# Patient Record
Sex: MALE | Race: WHITE | Employment: FULL TIME | URBAN - METROPOLITAN AREA
[De-identification: names, ages, dates, MRNs, and addresses within clinical notes are randomized per-mention and may not be internally consistent; named-entity substitution may affect disease eponyms.]

---

## 2018-07-27 ENCOUNTER — HOSPITAL ENCOUNTER (EMERGENCY)
Age: 28
Discharge: HOME | End: 2018-07-27
Attending: EMERGENCY MEDICINE

## 2018-07-27 VITALS
DIASTOLIC BLOOD PRESSURE: 78 MMHG | HEART RATE: 78 BPM | SYSTOLIC BLOOD PRESSURE: 124 MMHG | TEMPERATURE: 98.3 F | HEIGHT: 69 IN | OXYGEN SATURATION: 98 % | WEIGHT: 190 LBS | RESPIRATION RATE: 20 BRPM | BODY MASS INDEX: 28.14 KG/M2

## 2018-07-27 DIAGNOSIS — S69.91XA FINGER INJURY, RIGHT, INITIAL ENCOUNTER: ICD-10-CM

## 2018-07-27 DIAGNOSIS — T75.4XXA ELECTROCUTION AND NONFATAL EFFECTS OF ELECTRIC CURRENT, INITIAL ENCOUNTER: Primary | ICD-10-CM

## 2018-07-27 LAB
ANION GAP SERPL CALCULATED.3IONS-SCNC: 12 MMOL/L (ref 3–16)
BUN BLDV-MCNC: 11 MG/DL (ref 7–20)
CALCIUM SERPL-MCNC: 9.5 MG/DL (ref 8.3–10.6)
CHLORIDE BLD-SCNC: 103 MMOL/L (ref 99–110)
CO2: 26 MMOL/L (ref 21–32)
CREAT SERPL-MCNC: 1 MG/DL (ref 0.9–1.3)
GFR AFRICAN AMERICAN: >60
GFR NON-AFRICAN AMERICAN: >60
GLUCOSE BLD-MCNC: 99 MG/DL (ref 70–99)
HCT VFR BLD CALC: 48 % (ref 40.5–52.5)
HEMOGLOBIN: 16.8 G/DL (ref 13.5–17.5)
MCH RBC QN AUTO: 30.1 PG (ref 26–34)
MCHC RBC AUTO-ENTMCNC: 34.9 G/DL (ref 31–36)
MCV RBC AUTO: 86.2 FL (ref 80–100)
PDW BLD-RTO: 13.4 % (ref 12.4–15.4)
PLATELET # BLD: 289 K/UL (ref 135–450)
PMV BLD AUTO: 6.8 FL (ref 5–10.5)
POTASSIUM SERPL-SCNC: 3.7 MMOL/L (ref 3.5–5.1)
RBC # BLD: 5.57 M/UL (ref 4.2–5.9)
SODIUM BLD-SCNC: 141 MMOL/L (ref 136–145)
TOTAL CK: 94 U/L (ref 39–308)
TROPONIN: <0.01 NG/ML
WBC # BLD: 5.2 K/UL (ref 4–11)

## 2018-07-27 PROCEDURE — 80048 BASIC METABOLIC PNL TOTAL CA: CPT

## 2018-07-27 PROCEDURE — 85027 COMPLETE CBC AUTOMATED: CPT

## 2018-07-27 PROCEDURE — 93005 ELECTROCARDIOGRAM TRACING: CPT | Performed by: PHYSICIAN ASSISTANT

## 2018-07-27 PROCEDURE — 90471 IMMUNIZATION ADMIN: CPT | Performed by: PHYSICIAN ASSISTANT

## 2018-07-27 PROCEDURE — 6360000002 HC RX W HCPCS: Performed by: PHYSICIAN ASSISTANT

## 2018-07-27 PROCEDURE — 90715 TDAP VACCINE 7 YRS/> IM: CPT | Performed by: PHYSICIAN ASSISTANT

## 2018-07-27 PROCEDURE — 93010 ELECTROCARDIOGRAM REPORT: CPT | Performed by: INTERNAL MEDICINE

## 2018-07-27 PROCEDURE — 99283 EMERGENCY DEPT VISIT LOW MDM: CPT

## 2018-07-27 PROCEDURE — 82550 ASSAY OF CK (CPK): CPT

## 2018-07-27 PROCEDURE — 84484 ASSAY OF TROPONIN QUANT: CPT

## 2018-07-27 RX ORDER — ESCITALOPRAM OXALATE 20 MG/1
20 TABLET ORAL DAILY
COMMUNITY

## 2018-07-27 RX ADMIN — TETANUS TOXOID, REDUCED DIPHTHERIA TOXOID AND ACELLULAR PERTUSSIS VACCINE, ADSORBED 0.5 ML: 5; 2.5; 8; 8; 2.5 SUSPENSION INTRAMUSCULAR at 10:34

## 2018-07-27 ASSESSMENT — ENCOUNTER SYMPTOMS
BACK PAIN: 0
SHORTNESS OF BREATH: 0
EYES NEGATIVE: 1
ABDOMINAL PAIN: 0
FACIAL SWELLING: 0

## 2018-07-27 ASSESSMENT — PAIN SCALES - GENERAL
PAINLEVEL_OUTOF10: 8
PAINLEVEL_OUTOF10: 8

## 2018-07-27 ASSESSMENT — PAIN DESCRIPTION - LOCATION
LOCATION: WRIST;HAND;FINGER (COMMENT WHICH ONE)
LOCATION: HAND;FINGER (COMMENT WHICH ONE)
LOCATION: HAND;ARM

## 2018-07-27 ASSESSMENT — PAIN DESCRIPTION - ORIENTATION
ORIENTATION: RIGHT
ORIENTATION: RIGHT

## 2018-07-27 ASSESSMENT — PAIN SCALES - WONG BAKER: WONGBAKER_NUMERICALRESPONSE: 8

## 2018-07-27 ASSESSMENT — PAIN DESCRIPTION - PAIN TYPE: TYPE: ACUTE PAIN

## 2018-07-27 NOTE — ED PROVIDER NOTES
I independently performed a history and physical on Consuelo Francisco. All diagnostic, treatment, and disposition decisions were made by myself in conjunction with the mid-level provider. For further details of [facility] emergency department encounter, please see Larry Dasilva PACs documentation. Consuelo Francisco is a 34year old right hand dominant male who works for [employer]. This morning prior to arrival he was digging with a spade and he thinks he hit an electric cable. He is not certain of the voltage. He has a burn with bleeding from the right rihg finger tip. He reports \"tightness\" in the forearm. Excellent brachial, radial and ulnar pulses and brisk cap refill in all five nail beds of the right hand. No focal sensory or motor deficit; all components of the brachial plexus tested are symmetric and intact. There is no evidence at this time of compartment syndrome at this time.      BP (!) 144/94   Pulse 79   Temp 98.3 °F (36.8 °C) (Oral)   Resp 18   Ht 5' 9\" (1.753 m)   Wt 190 lb (86.2 kg)   SpO2 97%   BMI 28.06 kg/m²     Results for orders placed or performed during the hospital encounter of 07/27/18   CK   Result Value Ref Range    Total CK 94 39 - 308 U/L   Troponin   Result Value Ref Range    Troponin <0.01 <0.01 ng/mL   CBC   Result Value Ref Range    WBC 5.2 4.0 - 11.0 K/uL    RBC 5.57 4.20 - 5.90 M/uL    Hemoglobin 16.8 13.5 - 17.5 g/dL    Hematocrit 48.0 40.5 - 52.5 %    MCV 86.2 80.0 - 100.0 fL    MCH 30.1 26.0 - 34.0 pg    MCHC 34.9 31.0 - 36.0 g/dL    RDW 13.4 12.4 - 15.4 %    Platelets 368 949 - 402 K/uL    MPV 6.8 5.0 - 10.5 fL   Basic metabolic panel   Result Value Ref Range    Sodium 141 136 - 145 mmol/L    Potassium 3.7 3.5 - 5.1 mmol/L    Chloride 103 99 - 110 mmol/L    CO2 26 21 - 32 mmol/L    Anion Gap 12 3 - 16    Glucose 99 70 - 99 mg/dL    BUN 11 7 - 20 mg/dL    CREATININE 1.0 0.9 - 1.3 mg/dL    GFR Non-African American >60 >60    GFR African American >60 >60    Calcium 9.5 8.3 - 10.6 mg/dL   EKG 12 Lead   Result Value Ref Range    Ventricular Rate 71 BPM    Atrial Rate 71 BPM    P-R Interval 130 ms    QRS Duration 86 ms    Q-T Interval 378 ms    QTc Calculation (Bazett) 410 ms    P Axis 12 degrees    R Axis 81 degrees    T Axis 34 degrees    Diagnosis       Normal sinus rhythmNormal ECGNo previous ECGs availableConfirmed by [facility] REGAN CLAYTON, Alec Shen ([id number]) on 7/27/2018 4:54:12 PM         I estimate there is LOW risk for CELLULITIS, COMPARTMENT SYNDROME, NECROTIZING FASCIITIS, TENDON OR NEUROVASCULAR INJURY, or FOREIGN BODY, thus I consider the discharge disposition reasonable. Also, there is no evidence or peritonitis, sepsis, or toxicity. Serena Araya and I have discussed the diagnosis and risks, and we agree with discharging home to follow-up with their primary doctor. We also discussed returning to the Emergency Department immediately if new or worsening symptoms occur. We have discussed the symptoms which are most concerning (e.g., changing or worsening pain, fever, numbness, weakness, cool or painful digits) that necessitate immediate return. Final Impression    1. Electrocution and nonfatal effects of electric current, initial encounter    2. Finger injury, right, initial encounter        Discharge Vital Signs:  Blood pressure 124/78, pulse 78, temperature 98.3 °F (36.8 °C), temperature source Oral, resp. rate 20, height 5' 9\" (1.753 m), weight 190 lb (86.2 kg), SpO2 98 %.              Glen Browning MD  07/28/18 1194

## 2018-07-27 NOTE — ED PROVIDER NOTES
[address] [facility]  ED  eMERGENCY dEPARTMENT eNCOUnter        Pt Name: Mervat Shukla  MRN: [id number]  [place] 1990  Date of evaluation: 7/27/2018  Provider: Antoine Vera PA-C  PCP: No primary care provider on file. ED Attending: Johnetta Kanner, MD    History provided by the patient    CHIEF COMPLAINT:     Chief Complaint   Patient presents with   [address] Electric Shock     hit electrical wire with a shovel while working. blood & swelling noted to right 4th finger. \"arm pulsing from bicep down\". (-) LOC       HISTORY OF PRESENT ILLNESS:      Mervat Shukla is a 34 y.o. male who arrives to the ED by private vehicle. He drove himself here. 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 He and his colleagues were working on replacing [address] [employer] in [place]. He states he was sitting on the edge of a 4 foot hole, shoveling with the state it when he believes he had an electric line. He had a shock of pain extending up and down the right arm. He has some bleeding and bruising from the distal right ring finger, under and around the nail. He states he is still experiencing some pulsating type pain extending from his right bicep down. He did not lose consciousness. He denies any chest pain or palpitations. He is right-hand dominant. Nursing Notes were reviewed     REVIEW OF SYSTEMS:     Review of Systems   Constitutional: Negative for activity change, appetite change and fever. HENT: Negative for facial swelling. Eyes: Negative. Respiratory: Negative for shortness of breath. Cardiovascular: Negative for chest pain and palpitations. Gastrointestinal: Negative for abdominal pain. Genitourinary: Negative. Musculoskeletal: Positive for myalgias (Right arm). Negative for back pain, gait problem, neck pain and neck stiffness. Skin: Positive for wound (Right ring finger). Neurological: Negative for dizziness, weakness, light-headedness and headaches.    All other systems reviewed and are negative. Except as noted above in the ROS, all other systems were reviewed and negative. PAST MEDICAL HISTORY:   History reviewed. No pertinent past medical history. SURGICAL HISTORY:    History reviewed. No pertinent surgical history. CURRENT MEDICATIONS:       Previous Medications    ESCITALOPRAM (LEXAPRO) 20 MG TABLET    Take 20 mg by mouth daily         ALLERGIES:    Patient has no known allergies. FAMILY HISTORY:     History reviewed. No pertinent family history. SOCIAL HISTORY:       Social History     Social History    Marital status:      Spouse name: N/A    Number of children: N/A    Years of education: N/A     Social History Main Topics    Smoking status: Never Smoker    Smokeless tobacco: Current User     Types: Chew    Alcohol use Yes      Comment: occasionally    Drug use: No    Sexual activity: Not Asked     Other Topics Concern    None     Social History Narrative    None       SCREENINGS:             PHYSICAL EXAM:       ED Triage Vitals [07/27/18 0933]   BP Temp Temp Source Pulse Resp SpO2 Height Weight   (!) 144/94 98.3 °F (36.8 °C) Oral 79 18 97 % 5' 9\" (1.753 m) 190 lb (86.2 kg)       Physical Exam    CONSTITUTIONAL: Awake and alert. Cooperative. Well-developed. Well-nourished. Non-toxic. No acute distress. HENT: Normocephalic. Atraumatic. External ears normal, without discharge. No nasal discharge. Oropharynx clear. Mucous membranes moist.  EYES: Conjunctiva non-injected. No scleral icterus. PERRL. EOM's grossly intact. NECK: Supple. Normal ROM. CARDIOVASCULAR: RRR. No Murmer. Intact distal pulses. PULMONARY/CHEST WALL: Effort normal. No tachypnea. Lungs clear to ausculation. ABDOMEN: Normal BS. Soft. Nondistended. No tenderness to palpate. No guarding. /ANORECTAL: Not assessed  MUSKULOSKELETAL: Right upper extremity with wound to the distal ring finger.   Remaining musculoskeletal exam is benign without bony tenderness. There is no significant soft tissue swelling. Muscle groups remains soft and supple. Normal ROM. No acute deformities. No edema. Intact distal pulses and brisk capillary refill. SKIN: Warm and dry. Superficial open wound to the distal right ring finger with evidence of mild subungual hematoma. See picture below. I examined remaining surfaces of the skin including the head, neck, back, buttock, chest, abdomen as well as bilateral upper and lower extremities. I saw no wound (entry or exit) besides the wound to the distal aspect of the right ring finger. NEUROLOGICAL: Alert and oriented x 3. GCS 15. CN II-XII grossly intact. Strength is 5/5 in all extremities and sensation is intact. Normal gait. PSYCHIATRIC: Normal affect      Wound, distal right ring finger:            DIAGNOSTIC RESULTS:     LABS:    Results for orders placed or performed during the hospital encounter of 07/27/18   CK   Result Value Ref Range    Total CK 94 39 - 308 U/L   Troponin   Result Value Ref Range    Troponin <0.01 <0.01 ng/mL   CBC   Result Value Ref Range    WBC 5.2 4.0 - 11.0 K/uL    RBC 5.57 4.20 - 5.90 M/uL    Hemoglobin 16.8 13.5 - 17.5 g/dL    Hematocrit 48.0 40.5 - 52.5 %    MCV 86.2 80.0 - 100.0 fL    MCH 30.1 26.0 - 34.0 pg    MCHC 34.9 31.0 - 36.0 g/dL    RDW 13.4 12.4 - 15.4 %    Platelets 456 744 - 693 K/uL    MPV 6.8 5.0 - 10.5 fL   Basic metabolic panel   Result Value Ref Range    Sodium 141 136 - 145 mmol/L    Potassium 3.7 3.5 - 5.1 mmol/L    Chloride 103 99 - 110 mmol/L    CO2 26 21 - 32 mmol/L    Anion Gap 12 3 - 16    Glucose 99 70 - 99 mg/dL    BUN 11 7 - 20 mg/dL    CREATININE 1.0 0.9 - 1.3 mg/dL    GFR Non-African American >60 >60    GFR African American >60 >60    Calcium 9.5 8.3 - 10.6 mg/dL           EKG: The Ekg interpreted by me in the absence of a cardiologist shows.   normal sinus rhythm with a rate of 71  Axis is   Normal  QTc is  within an acceptable range  Intervals and Durations are unremarkable. No specific ST-T wave changes appreciated. No evidence of acute ischemia. See also interpretation by Nicki Palacios MD.      PROCEDURES:   N/A    CRITICAL CARE TIME:   N/A    CONSULTS:  None      EMERGENCY DEPARTMENT COURSE and DIFFERENTIAL DIAGNOSIS/MDM:   Vitals:    Vitals:    07/27/18 0933 07/27/18 1031   BP: (!) 144/94 121/72   Pulse: 79 73   Resp: 18 20   Temp: 98.3 °F (36.8 °C)    TempSrc: Oral    SpO2: 97% 98%   Weight: 190 lb (86.2 kg)    Height: 5' 9\" (1.753 m)        Patient was given the following medications:  Medications   Tetanus-Diphth-Acell Pertussis (BOOSTRIX) injection 0.5 mL (0.5 mLs Intramuscular Given 7/27/18 1034)         Patient was evaluated by both myself and Nicki Palacios MD. The patient was taking a whole to work on a gas line for work today when he hit an electric line. He sustained a wound whether it be entry or exit, to the right ring finger. He describes some pain in his right arm that is slowly improving. Based on the electric shock/injury a EKG is done which reveals normal sinus rhythm at a rate of 71 bpm.  There are no concerning changes. A CBC, BMP, troponin and CK are all completely normal.  He is updated on his tetanus vaccination here. He is counseled on electric injuries and compartment syndrome. At this time, there are no other wounds to his body outside of his right ring finger. All muscle groups, with particular attention noted to the right upper extremity are soft, supple. Distal pulses are intact. There is no evidence of compartment syndrome at this time. He is feeling better and better. He will ultimately be discharged to follow-up for recheck in one day. He is given [facility] follow-up as well. I estimate there is LOW risk for FRACTURE, COMPARTMENT SYNDROME, ACUTE CORONARY SYNDROME, MALIGNANT DYSRHYTHMIA, thus I consider the discharge disposition reasonable.  Isabelle Nicole and I have discussed the diagnosis and risks, and we agree with discharging home to follow-up with their primary doctor. We also discussed returning to the Emergency Department immediately if new or worsening symptoms occur. We have discussed the symptoms which are most concerning (e.g., worsening pain, swelling, weakness, numbness) that necessitate immediate return. FINAL IMPRESSION:      1. Electrocution and nonfatal effects of electric current, initial encounter    2.  Finger injury, right, initial encounter          DISPOSITION/PLAN:   DISPOSITION Decision To Discharge      PATIENT REFERRED TO:  [facility]  ED  [address] [facility] [address] [facility]  Go in 1 day  Come back for any persistent or worsening pain    [place] [id number] [place] [id number]    Schedule an appointment as soon as possible for a visit         DISCHARGE MEDICATIONS:  New Prescriptions    No medications on file                  (Please note that portions of this note were completed with a voice recognition program.  Efforts were made to edit the dictations, but occasionally words are mis-transcribed.)    Akil Nation PA-C (electronically signed)             Db Wilson  07/27/18 1128

## 2018-07-31 LAB
EKG ATRIAL RATE: 71 BPM
EKG DIAGNOSIS: NORMAL
EKG P AXIS: 12 DEGREES
EKG P-R INTERVAL: 130 MS
EKG Q-T INTERVAL: 378 MS
EKG QRS DURATION: 86 MS
EKG QTC CALCULATION (BAZETT): 410 MS
EKG R AXIS: 81 DEGREES
EKG T AXIS: 34 DEGREES
EKG VENTRICULAR RATE: 71 BPM